# Patient Record
Sex: MALE | Race: BLACK OR AFRICAN AMERICAN
[De-identification: names, ages, dates, MRNs, and addresses within clinical notes are randomized per-mention and may not be internally consistent; named-entity substitution may affect disease eponyms.]

---

## 2020-05-01 ENCOUNTER — HOSPITAL ENCOUNTER (EMERGENCY)
Dept: HOSPITAL 12 - ER | Age: 35
LOS: 1 days | Discharge: HOME | End: 2020-05-02
Payer: COMMERCIAL

## 2020-05-01 VITALS — HEIGHT: 72 IN | BODY MASS INDEX: 27.09 KG/M2 | WEIGHT: 200 LBS

## 2020-05-01 DIAGNOSIS — Z91.14: ICD-10-CM
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
## 2020-05-01 DIAGNOSIS — Z59.0: ICD-10-CM

## 2020-05-01 DIAGNOSIS — R94.31: ICD-10-CM

## 2020-05-01 DIAGNOSIS — F10.129: ICD-10-CM

## 2020-05-01 DIAGNOSIS — F25.9: ICD-10-CM

## 2020-05-01 DIAGNOSIS — R45.851: Primary | ICD-10-CM

## 2020-05-01 LAB
ALP SERPL-CCNC: 100 U/L (ref 50–136)
ALT SERPL W/O P-5'-P-CCNC: 56 U/L (ref 16–63)
AMPHETAMINES UR QL SCN>1000 NG/ML: NEGATIVE
APAP SERPL-MCNC: < 2 UG/ML (ref 10–30)
APPEARANCE UR: CLEAR
AST SERPL-CCNC: 37 U/L (ref 15–37)
BARBITURATES UR QL SCN: NEGATIVE
BASOPHILS # BLD AUTO: 0 K/UL (ref 0–8)
BASOPHILS NFR BLD AUTO: 0.8 % (ref 0–2)
BILIRUB DIRECT SERPL-MCNC: 0.2 MG/DL (ref 0–0.2)
BILIRUB SERPL-MCNC: 0.3 MG/DL (ref 0.2–1)
BILIRUB UR QL STRIP: NEGATIVE
BUN SERPL-MCNC: 9 MG/DL (ref 7–18)
CHLORIDE SERPL-SCNC: 101 MMOL/L (ref 98–107)
CK SERPL-CCNC: 82 U/L (ref 39–308)
CO2 SERPL-SCNC: 32 MMOL/L (ref 21–32)
COCAINE UR QL SCN: NEGATIVE
COLOR UR: YELLOW
CREAT SERPL-MCNC: 1.2 MG/DL (ref 0.6–1.3)
DEPRECATED SQUAMOUS URNS QL MICRO: (no result) /HPF
EOSINOPHIL # BLD AUTO: 0.1 K/UL (ref 0–0.7)
EOSINOPHIL NFR BLD AUTO: 1 % (ref 0–7)
ETHANOL SERPL-MCNC: 156 MG/DL (ref 0–0)
GLUCOSE SERPL-MCNC: 89 MG/DL (ref 74–106)
GLUCOSE UR STRIP-MCNC: NEGATIVE MG/DL
HCT VFR BLD AUTO: 44.9 % (ref 36.7–47.1)
HGB BLD-MCNC: 14.9 G/DL (ref 12.5–16.3)
HGB UR QL STRIP: NEGATIVE
KETONES UR STRIP-MCNC: NEGATIVE MG/DL
LEUKOCYTE ESTERASE UR QL STRIP: NEGATIVE
LYMPHOCYTES # BLD AUTO: 2.1 K/UL (ref 20–40)
LYMPHOCYTES NFR BLD AUTO: 34.4 % (ref 20.5–51.5)
MCH RBC QN AUTO: 29.2 UUG (ref 23.8–33.4)
MCHC RBC AUTO-ENTMCNC: 33 G/DL (ref 32.5–36.3)
MCV RBC AUTO: 88.2 FL (ref 73–96.2)
MONOCYTES # BLD AUTO: 0.3 K/UL (ref 2–10)
MONOCYTES NFR BLD AUTO: 5.1 % (ref 0–11)
MUCOUS THREADS URNS QL MICRO: (no result) /LPF
NEUTROPHILS # BLD AUTO: 3.7 K/UL (ref 1.8–8.9)
NEUTROPHILS NFR BLD AUTO: 58.7 % (ref 38.5–71.5)
NITRITE UR QL STRIP: NEGATIVE
OPIATES UR QL SCN: NEGATIVE
PCP UR QL SCN>25 NG/ML: NEGATIVE
PH UR STRIP: 8.5 [PH] (ref 5–8)
PLATELET # BLD AUTO: 317 K/UL (ref 152–348)
POTASSIUM SERPL-SCNC: 3.9 MMOL/L (ref 3.5–5.1)
RBC # BLD AUTO: 5.1 MIL/UL (ref 4.06–5.63)
SP GR UR STRIP: 1.01 (ref 1–1.03)
THC UR QL SCN>50 NG/ML: POSITIVE
TSH SERPL DL<=0.005 MIU/L-ACNC: 0.34 MIU/ML (ref 0.36–3.74)
UROBILINOGEN UR STRIP-MCNC: 0.2 E.U./DL
WBC # BLD AUTO: 6.2 K/UL (ref 3.6–10.2)
WBC #/AREA URNS HPF: (no result) /HPF (ref 0–3)
WS STN SPEC: 7.7 G/DL (ref 6.4–8.2)

## 2020-05-01 PROCEDURE — 36415 COLL VENOUS BLD VENIPUNCTURE: CPT

## 2020-05-01 PROCEDURE — 80076 HEPATIC FUNCTION PANEL: CPT

## 2020-05-01 PROCEDURE — 93005 ELECTROCARDIOGRAM TRACING: CPT

## 2020-05-01 PROCEDURE — 82550 ASSAY OF CK (CPK): CPT

## 2020-05-01 PROCEDURE — A4663 DIALYSIS BLOOD PRESSURE CUFF: HCPCS

## 2020-05-01 PROCEDURE — 99284 EMERGENCY DEPT VISIT MOD MDM: CPT

## 2020-05-01 PROCEDURE — 80307 DRUG TEST PRSMV CHEM ANLYZR: CPT

## 2020-05-01 PROCEDURE — 84443 ASSAY THYROID STIM HORMONE: CPT

## 2020-05-01 PROCEDURE — 80048 BASIC METABOLIC PNL TOTAL CA: CPT

## 2020-05-01 PROCEDURE — 81001 URINALYSIS AUTO W/SCOPE: CPT

## 2020-05-01 PROCEDURE — 80329 ANALGESICS NON-OPIOID 1 OR 2: CPT

## 2020-05-01 PROCEDURE — 85025 COMPLETE CBC W/AUTO DIFF WBC: CPT

## 2020-05-01 PROCEDURE — G0480 DRUG TEST DEF 1-7 CLASSES: HCPCS

## 2020-05-01 SDOH — ECONOMIC STABILITY - HOUSING INSECURITY: HOMELESSNESS: Z59.0

## 2020-05-01 NOTE — NUR
Recieved BIB rescue ambulance for SI, placed in bed 2A by triage RN Bc at 
2210. Patient is alert and oriented x 4, noted to talking to himself. Patient 
states he wants to "run in front of a moving bus." Belongings removed from 
patient side. Pt wanded by security rosi Victor. Patient in no acute distress. 
Seen and examined by Dr. Patiño on arrival. 

-------------------------------------------------------------------------------

Addendum: 05/01/20 at 2220 by APOLLO

-------------------------------------------------------------------------------

error on word 'recieved.'

## 2020-05-01 NOTE — NUR
Per Dr. Patiño patient is medically cleared. Paged Art (crisis evaluator) at 
[phone], stated he will be here. Did not provided ETA.

## 2020-05-01 NOTE — NUR
Recieved call from Art asking for presumptive medi-simba, spoke with Ana in 
admitting stated patient [place] insurance in another state and office is closed. 
Per Art his ETA is 30-40 minutes.

## 2020-05-02 NOTE — NUR
Dr. Patiño at bedside spoke with patient, Dr. Patiño asked patient if he was 
suicidal, patient denied SI. Patient pending for  or CM to provide 
for tap card.

## 2020-05-02 NOTE — NUR
PATIENT IS SITTING UP EATING BREAKFAST AND DRINKING JUICE AND WATER.   I FOUND 
HIM A SHELTER, ITS CALLED THE POOL AND THERE IS AVAILABLE ROOMS.  I GOT HIM 
A TAP CARD FROM NURSING SUPERVISOR AND HE SAID HE WILL GO THERE IF NEED BE.  HE 
IS WEARING CLEAN CLOTHES AND CLEAN, INTACT SHOES. 

DC AND FOLLOW UP INSTRUCTIONS GIVEN AND EXPLAINED TO PATIENT .

## 2020-05-02 NOTE — NUR
Patient asleep in [facility], no acute distress. Per Art psych evaluator that was 
here in the evening patient to be discharge in AM after seeing  and 
to recieve a tap card.

## 2020-05-02 NOTE — NUR
I ASKED PATIENT IF HE HAS INTENTION TOHURT HOMESLEF AND HE SAID NO.   HE SAID 
HE MAY NEED A SHELTER.